# Patient Record
Sex: FEMALE | Race: WHITE | Employment: OTHER | URBAN - METROPOLITAN AREA
[De-identification: names, ages, dates, MRNs, and addresses within clinical notes are randomized per-mention and may not be internally consistent; named-entity substitution may affect disease eponyms.]

---

## 2024-08-13 ENCOUNTER — HOSPITAL ENCOUNTER (INPATIENT)
Age: 69
LOS: 3 days | Discharge: PSYCHIATRIC OR SUBSTANCE USE FACILITY | DRG: 312 | End: 2024-08-19
Attending: EMERGENCY MEDICINE | Admitting: HOSPITALIST
Payer: MEDICARE

## 2024-08-13 ENCOUNTER — APPOINTMENT (OUTPATIENT)
Dept: GENERAL RADIOLOGY | Age: 69
DRG: 312 | End: 2024-08-13
Payer: MEDICARE

## 2024-08-13 DIAGNOSIS — R55 SYNCOPE AND COLLAPSE: Primary | ICD-10-CM

## 2024-08-13 DIAGNOSIS — R55 SYNCOPE, UNSPECIFIED SYNCOPE TYPE: ICD-10-CM

## 2024-08-13 LAB
ALBUMIN SERPL-MCNC: 4.2 G/DL (ref 3.4–5)
ALBUMIN/GLOB SERPL: 1.5 {RATIO} (ref 1.1–2.2)
ALP SERPL-CCNC: 82 U/L (ref 40–129)
ALT SERPL-CCNC: 6 U/L (ref 10–40)
ANION GAP SERPL CALCULATED.3IONS-SCNC: 17 MMOL/L (ref 3–16)
AST SERPL-CCNC: 17 U/L (ref 15–37)
BASOPHILS # BLD: 0.1 K/UL (ref 0–0.2)
BASOPHILS NFR BLD: 0.8 %
BILIRUB SERPL-MCNC: 0.6 MG/DL (ref 0–1)
BUN SERPL-MCNC: 23 MG/DL (ref 7–20)
CALCIUM SERPL-MCNC: 9.8 MG/DL (ref 8.3–10.6)
CHLORIDE SERPL-SCNC: 98 MMOL/L (ref 99–110)
CO2 SERPL-SCNC: 22 MMOL/L (ref 21–32)
CREAT SERPL-MCNC: 1.1 MG/DL (ref 0.6–1.2)
DEPRECATED RDW RBC AUTO: 13.6 % (ref 12.4–15.4)
EOSINOPHIL # BLD: 0 K/UL (ref 0–0.6)
EOSINOPHIL NFR BLD: 0.3 %
GFR SERPLBLD CREATININE-BSD FMLA CKD-EPI: 54 ML/MIN/{1.73_M2}
GLUCOSE SERPL-MCNC: 115 MG/DL (ref 70–99)
HCT VFR BLD AUTO: 46 % (ref 36–48)
HGB BLD-MCNC: 15.5 G/DL (ref 12–16)
LYMPHOCYTES # BLD: 1.1 K/UL (ref 1–5.1)
LYMPHOCYTES NFR BLD: 14.4 %
MCH RBC QN AUTO: 29.2 PG (ref 26–34)
MCHC RBC AUTO-ENTMCNC: 33.8 G/DL (ref 31–36)
MCV RBC AUTO: 86.4 FL (ref 80–100)
MONOCYTES # BLD: 0.4 K/UL (ref 0–1.3)
MONOCYTES NFR BLD: 4.6 %
NEUTROPHILS # BLD: 6.2 K/UL (ref 1.7–7.7)
NEUTROPHILS NFR BLD: 79.9 %
PLATELET # BLD AUTO: 324 K/UL (ref 135–450)
PMV BLD AUTO: 7 FL (ref 5–10.5)
POTASSIUM SERPL-SCNC: 3.7 MMOL/L (ref 3.5–5.1)
PROT SERPL-MCNC: 7 G/DL (ref 6.4–8.2)
RBC # BLD AUTO: 5.32 M/UL (ref 4–5.2)
SODIUM SERPL-SCNC: 137 MMOL/L (ref 136–145)
TROPONIN, HIGH SENSITIVITY: 8 NG/L (ref 0–14)
WBC # BLD AUTO: 7.8 K/UL (ref 4–11)

## 2024-08-13 PROCEDURE — 84484 ASSAY OF TROPONIN QUANT: CPT

## 2024-08-13 PROCEDURE — 71045 X-RAY EXAM CHEST 1 VIEW: CPT

## 2024-08-13 PROCEDURE — 85025 COMPLETE CBC W/AUTO DIFF WBC: CPT

## 2024-08-13 PROCEDURE — 93005 ELECTROCARDIOGRAM TRACING: CPT | Performed by: EMERGENCY MEDICINE

## 2024-08-13 PROCEDURE — 36415 COLL VENOUS BLD VENIPUNCTURE: CPT

## 2024-08-13 PROCEDURE — G0378 HOSPITAL OBSERVATION PER HR: HCPCS

## 2024-08-13 PROCEDURE — 2580000003 HC RX 258: Performed by: HOSPITALIST

## 2024-08-13 PROCEDURE — 99285 EMERGENCY DEPT VISIT HI MDM: CPT

## 2024-08-13 PROCEDURE — 2580000003 HC RX 258: Performed by: EMERGENCY MEDICINE

## 2024-08-13 PROCEDURE — 80053 COMPREHEN METABOLIC PANEL: CPT

## 2024-08-13 RX ORDER — SODIUM CHLORIDE 0.9 % (FLUSH) 0.9 %
5-40 SYRINGE (ML) INJECTION EVERY 12 HOURS SCHEDULED
Status: DISCONTINUED | OUTPATIENT
Start: 2024-08-13 | End: 2024-08-19 | Stop reason: HOSPADM

## 2024-08-13 RX ORDER — 0.9 % SODIUM CHLORIDE 0.9 %
1000 INTRAVENOUS SOLUTION INTRAVENOUS ONCE
Status: COMPLETED | OUTPATIENT
Start: 2024-08-13 | End: 2024-08-13

## 2024-08-13 RX ORDER — ACETAMINOPHEN 650 MG/1
650 SUPPOSITORY RECTAL EVERY 6 HOURS PRN
Status: DISCONTINUED | OUTPATIENT
Start: 2024-08-13 | End: 2024-08-19 | Stop reason: HOSPADM

## 2024-08-13 RX ORDER — POTASSIUM CHLORIDE 7.45 MG/ML
10 INJECTION INTRAVENOUS PRN
Status: DISCONTINUED | OUTPATIENT
Start: 2024-08-13 | End: 2024-08-19 | Stop reason: HOSPADM

## 2024-08-13 RX ORDER — POTASSIUM CHLORIDE 20 MEQ/1
40 TABLET, EXTENDED RELEASE ORAL PRN
Status: DISCONTINUED | OUTPATIENT
Start: 2024-08-13 | End: 2024-08-19 | Stop reason: HOSPADM

## 2024-08-13 RX ORDER — SODIUM CHLORIDE 9 MG/ML
INJECTION, SOLUTION INTRAVENOUS CONTINUOUS
Status: DISCONTINUED | OUTPATIENT
Start: 2024-08-13 | End: 2024-08-15

## 2024-08-13 RX ORDER — ONDANSETRON 2 MG/ML
4 INJECTION INTRAMUSCULAR; INTRAVENOUS EVERY 6 HOURS PRN
Status: DISCONTINUED | OUTPATIENT
Start: 2024-08-13 | End: 2024-08-19 | Stop reason: HOSPADM

## 2024-08-13 RX ORDER — ENOXAPARIN SODIUM 100 MG/ML
40 INJECTION SUBCUTANEOUS DAILY
Status: DISCONTINUED | OUTPATIENT
Start: 2024-08-14 | End: 2024-08-19 | Stop reason: HOSPADM

## 2024-08-13 RX ORDER — SODIUM CHLORIDE 0.9 % (FLUSH) 0.9 %
5-40 SYRINGE (ML) INJECTION PRN
Status: DISCONTINUED | OUTPATIENT
Start: 2024-08-13 | End: 2024-08-19 | Stop reason: HOSPADM

## 2024-08-13 RX ORDER — SODIUM CHLORIDE 9 MG/ML
INJECTION, SOLUTION INTRAVENOUS PRN
Status: DISCONTINUED | OUTPATIENT
Start: 2024-08-13 | End: 2024-08-19 | Stop reason: HOSPADM

## 2024-08-13 RX ORDER — POLYETHYLENE GLYCOL 3350 17 G/17G
17 POWDER, FOR SOLUTION ORAL DAILY PRN
Status: DISCONTINUED | OUTPATIENT
Start: 2024-08-13 | End: 2024-08-19 | Stop reason: HOSPADM

## 2024-08-13 RX ORDER — MAGNESIUM SULFATE IN WATER 40 MG/ML
2000 INJECTION, SOLUTION INTRAVENOUS PRN
Status: DISCONTINUED | OUTPATIENT
Start: 2024-08-13 | End: 2024-08-19 | Stop reason: HOSPADM

## 2024-08-13 RX ORDER — ONDANSETRON 4 MG/1
4 TABLET, ORALLY DISINTEGRATING ORAL EVERY 8 HOURS PRN
Status: DISCONTINUED | OUTPATIENT
Start: 2024-08-13 | End: 2024-08-19 | Stop reason: HOSPADM

## 2024-08-13 RX ORDER — ACETAMINOPHEN 325 MG/1
650 TABLET ORAL EVERY 6 HOURS PRN
Status: DISCONTINUED | OUTPATIENT
Start: 2024-08-13 | End: 2024-08-19 | Stop reason: HOSPADM

## 2024-08-13 RX ADMIN — SODIUM CHLORIDE 1000 ML: 9 INJECTION, SOLUTION INTRAVENOUS at 17:53

## 2024-08-13 RX ADMIN — SODIUM CHLORIDE: 9 INJECTION, SOLUTION INTRAVENOUS at 23:47

## 2024-08-13 ASSESSMENT — LIFESTYLE VARIABLES
HOW MANY STANDARD DRINKS CONTAINING ALCOHOL DO YOU HAVE ON A TYPICAL DAY: PATIENT DOES NOT DRINK
HOW OFTEN DO YOU HAVE A DRINK CONTAINING ALCOHOL: NEVER

## 2024-08-13 ASSESSMENT — PAIN SCALES - GENERAL: PAINLEVEL_OUTOF10: 0

## 2024-08-13 NOTE — ED PROVIDER NOTES
complexes otherwise normal    ED COURSE/MDM    68 y.o. female presents following a witnessed syncopal episode.  No prodromal symptoms.  Patient is hypertensive with otherwise reassuring vital signs.  She is asymptomatic with respect to the hypertension.  EKG shows no acute ischemic abnormality to my depend interpretation.  Chest x-ray no focal infiltrate to my independent interpretation.  CBC no anemia.  Renal panel without significant electrolyte abnormality.  Creatinine within normal limits.  No recent echo on file.  Case discussed with Dr. Gerber/[facility] medicine via [employer].  Admit to  for further evaluation and treatment.    - History obtained from: pt, EMS     - Consults:   IP CONSULT TO HOSPITALIST     Chronic Conditions: HTN      Is this patient to be included in the SEP-1 Core Measure?  No     Exclusion criteria - the patient is NOT to be included for SEP-1 Core Measure due to:  Infection is not suspected    IBryna MD, am the primary clinician of record.     During the patient's ED course, the patient was given:  Medications   sodium chloride 0.9 % bolus 1,000 mL (0 mLs IntraVENous Stopped 8/13/24 1900)      All questions were answered and the patient/family expressed understanding and agreement with the plan.     PROCEDURES  None    CRITICAL CARE  N/A    CLINICAL IMPRESSION  1. Syncope and collapse        DISPOSITION  Admitted 08/13/2024 08:49:57 PM     Bryan Mcdaniel MD    Note: This chart was created using voice recognition dictation software. Efforts were made by me to ensure accuracy, however some errors may be present due to limitations of this technology and occasionally words are not transcribed correctly.      Bryan Mcdaniel MD  08/13/24 0025

## 2024-08-13 NOTE — ED TRIAGE NOTES
PT to room by [facility]. Per EMS, pt had syncopal episode at home in the presence of her son. Pt son caught pt and lowered her to floor, and called EMS, EMS denies pt fall or injury, and states the son said she was only unconscious for \"a few seconds\". EMS reports hx of dementia and states they were unable to determine if pt mental status deviated from baseline. EMS states pt son is in route. Pt denies pain currently.

## 2024-08-14 ENCOUNTER — APPOINTMENT (OUTPATIENT)
Dept: CT IMAGING | Age: 69
DRG: 312 | End: 2024-08-14
Payer: MEDICARE

## 2024-08-14 ENCOUNTER — APPOINTMENT (OUTPATIENT)
Age: 69
DRG: 312 | End: 2024-08-14
Payer: MEDICARE

## 2024-08-14 LAB
ALBUMIN SERPL-MCNC: 3.7 G/DL (ref 3.4–5)
ALP SERPL-CCNC: 66 U/L (ref 40–129)
ALT SERPL-CCNC: <5 U/L (ref 10–40)
ANION GAP SERPL CALCULATED.3IONS-SCNC: 10 MMOL/L (ref 3–16)
AST SERPL-CCNC: 15 U/L (ref 15–37)
BASOPHILS # BLD: 0.1 K/UL (ref 0–0.2)
BASOPHILS NFR BLD: 0.9 %
BILIRUB DIRECT SERPL-MCNC: <0.2 MG/DL (ref 0–0.3)
BILIRUB INDIRECT SERPL-MCNC: ABNORMAL MG/DL (ref 0–1)
BILIRUB SERPL-MCNC: 0.6 MG/DL (ref 0–1)
BILIRUB UR QL STRIP.AUTO: NEGATIVE
BUN SERPL-MCNC: 29 MG/DL (ref 7–20)
CALCIUM SERPL-MCNC: 9 MG/DL (ref 8.3–10.6)
CHLORIDE SERPL-SCNC: 101 MMOL/L (ref 99–110)
CLARITY UR: CLEAR
CO2 SERPL-SCNC: 22 MMOL/L (ref 21–32)
COLOR UR: YELLOW
CREAT SERPL-MCNC: 0.9 MG/DL (ref 0.6–1.2)
DEPRECATED RDW RBC AUTO: 13.5 % (ref 12.4–15.4)
ECHO AO ROOT DIAM: 3.3 CM
ECHO AO ROOT INDEX: 1.91 CM/M2
ECHO AV PEAK GRADIENT: 8 MMHG
ECHO AV PEAK VELOCITY: 1.4 M/S
ECHO AV VELOCITY RATIO: 0.57
ECHO BSA: 1.68 M2
ECHO LA AREA 2C: 14.6 CM2
ECHO LA AREA 4C: 11.8 CM2
ECHO LA DIAMETER INDEX: 1.62 CM/M2
ECHO LA DIAMETER: 2.8 CM
ECHO LA MAJOR AXIS: 4 CM
ECHO LA MINOR AXIS: 4.2 CM
ECHO LA TO AORTIC ROOT RATIO: 0.85
ECHO LA VOL BP: 34 ML (ref 22–52)
ECHO LA VOL MOD A2C: 40 ML (ref 22–52)
ECHO LA VOL MOD A4C: 27 ML (ref 22–52)
ECHO LA VOL/BSA BIPLANE: 20 ML/M2 (ref 16–34)
ECHO LA VOLUME INDEX MOD A2C: 23 ML/M2 (ref 16–34)
ECHO LA VOLUME INDEX MOD A4C: 16 ML/M2 (ref 16–34)
ECHO LV E' LATERAL VELOCITY: 5 CM/S
ECHO LV E' SEPTAL VELOCITY: 6 CM/S
ECHO LV FRACTIONAL SHORTENING: 20 % (ref 28–44)
ECHO LV INTERNAL DIMENSION DIASTOLE INDEX: 2.31 CM/M2
ECHO LV INTERNAL DIMENSION DIASTOLIC: 4 CM (ref 3.9–5.3)
ECHO LV INTERNAL DIMENSION SYSTOLIC INDEX: 1.85 CM/M2
ECHO LV INTERNAL DIMENSION SYSTOLIC: 3.2 CM
ECHO LV IVSD: 1.4 CM (ref 0.6–0.9)
ECHO LV MASS 2D: 175.8 G (ref 67–162)
ECHO LV MASS INDEX 2D: 101.6 G/M2 (ref 43–95)
ECHO LV POSTERIOR WALL DIASTOLIC: 1.1 CM (ref 0.6–0.9)
ECHO LV RELATIVE WALL THICKNESS RATIO: 0.55
ECHO LVOT PEAK GRADIENT: 3 MMHG
ECHO LVOT PEAK VELOCITY: 0.8 M/S
ECHO MV A VELOCITY: 1.19 M/S
ECHO MV E DECELERATION TIME (DT): 142 MS
ECHO MV E VELOCITY: 0.69 M/S
ECHO MV E/A RATIO: 0.58
ECHO MV E/E' LATERAL: 13.8
ECHO MV E/E' RATIO (AVERAGED): 12.65
ECHO MV E/E' SEPTAL: 11.5
ECHO PV MAX VELOCITY: 1 M/S
ECHO PV PEAK GRADIENT: 4 MMHG
ECHO RA AREA 4C: 9.5 CM2
ECHO RA END SYSTOLIC VOLUME APICAL 4 CHAMBER INDEX BSA: 10 ML/M2
ECHO RA VOLUME: 18 ML
ECHO RV FREE WALL PEAK S': 11 CM/S
ECHO RV INTERNAL DIMENSION: 1.2 CM
ECHO RV TAPSE: 2 CM (ref 1.7–?)
ECHO TV REGURGITANT MAX VELOCITY: 2.44 M/S
ECHO TV REGURGITANT PEAK GRADIENT: 24 MMHG
EKG ATRIAL RATE: 106 BPM
EKG DIAGNOSIS: NORMAL
EKG P AXIS: 76 DEGREES
EKG P-R INTERVAL: 146 MS
EKG Q-T INTERVAL: 350 MS
EKG QRS DURATION: 82 MS
EKG QTC CALCULATION (BAZETT): 464 MS
EKG R AXIS: 79 DEGREES
EKG T AXIS: 77 DEGREES
EKG VENTRICULAR RATE: 106 BPM
EOSINOPHIL # BLD: 0.1 K/UL (ref 0–0.6)
EOSINOPHIL NFR BLD: 1.1 %
FOLATE SERPL-MCNC: 12.8 NG/ML (ref 4.78–24.2)
GFR SERPLBLD CREATININE-BSD FMLA CKD-EPI: 69 ML/MIN/{1.73_M2}
GLUCOSE SERPL-MCNC: 73 MG/DL (ref 70–99)
GLUCOSE UR STRIP.AUTO-MCNC: NEGATIVE MG/DL
HCT VFR BLD AUTO: 39.9 % (ref 36–48)
HGB BLD-MCNC: 13.7 G/DL (ref 12–16)
HGB UR QL STRIP.AUTO: NEGATIVE
KETONES UR STRIP.AUTO-MCNC: NEGATIVE MG/DL
LACTATE BLDV-SCNC: 0.8 MMOL/L (ref 0.4–2)
LEUKOCYTE ESTERASE UR QL STRIP.AUTO: NEGATIVE
LYMPHOCYTES # BLD: 1.6 K/UL (ref 1–5.1)
LYMPHOCYTES NFR BLD: 26 %
MCH RBC QN AUTO: 29.4 PG (ref 26–34)
MCHC RBC AUTO-ENTMCNC: 34.3 G/DL (ref 31–36)
MCV RBC AUTO: 85.9 FL (ref 80–100)
MONOCYTES # BLD: 0.5 K/UL (ref 0–1.3)
MONOCYTES NFR BLD: 7.9 %
NEUTROPHILS # BLD: 4 K/UL (ref 1.7–7.7)
NEUTROPHILS NFR BLD: 64.1 %
NITRITE UR QL STRIP.AUTO: NEGATIVE
PH UR STRIP.AUTO: 5.5 [PH] (ref 5–8)
PLATELET # BLD AUTO: 271 K/UL (ref 135–450)
PMV BLD AUTO: 7.4 FL (ref 5–10.5)
POTASSIUM SERPL-SCNC: 3.5 MMOL/L (ref 3.5–5.1)
PROT SERPL-MCNC: 6 G/DL (ref 6.4–8.2)
PROT UR STRIP.AUTO-MCNC: NEGATIVE MG/DL
RBC # BLD AUTO: 4.64 M/UL (ref 4–5.2)
SODIUM SERPL-SCNC: 133 MMOL/L (ref 136–145)
SP GR UR STRIP.AUTO: 1.02 (ref 1–1.03)
TROPONIN, HIGH SENSITIVITY: 10 NG/L (ref 0–14)
TROPONIN, HIGH SENSITIVITY: 9 NG/L (ref 0–14)
TSH SERPL DL<=0.005 MIU/L-ACNC: 2.29 UIU/ML (ref 0.27–4.2)
UA COMPLETE W REFLEX CULTURE PNL UR: NORMAL
UA DIPSTICK W REFLEX MICRO PNL UR: NORMAL
URN SPEC COLLECT METH UR: NORMAL
UROBILINOGEN UR STRIP-ACNC: 1 E.U./DL
VIT B12 SERPL-MCNC: 240 PG/ML (ref 211–911)
WBC # BLD AUTO: 6.2 K/UL (ref 4–11)

## 2024-08-14 PROCEDURE — 80048 BASIC METABOLIC PNL TOTAL CA: CPT

## 2024-08-14 PROCEDURE — G0378 HOSPITAL OBSERVATION PER HR: HCPCS

## 2024-08-14 PROCEDURE — 83605 ASSAY OF LACTIC ACID: CPT

## 2024-08-14 PROCEDURE — 96372 THER/PROPH/DIAG INJ SC/IM: CPT

## 2024-08-14 PROCEDURE — 81003 URINALYSIS AUTO W/O SCOPE: CPT

## 2024-08-14 PROCEDURE — 70450 CT HEAD/BRAIN W/O DYE: CPT

## 2024-08-14 PROCEDURE — 93306 TTE W/DOPPLER COMPLETE: CPT | Performed by: INTERNAL MEDICINE

## 2024-08-14 PROCEDURE — 84443 ASSAY THYROID STIM HORMONE: CPT

## 2024-08-14 PROCEDURE — 82746 ASSAY OF FOLIC ACID SERUM: CPT

## 2024-08-14 PROCEDURE — 6360000002 HC RX W HCPCS: Performed by: HOSPITALIST

## 2024-08-14 PROCEDURE — 82607 VITAMIN B-12: CPT

## 2024-08-14 PROCEDURE — 80076 HEPATIC FUNCTION PANEL: CPT

## 2024-08-14 PROCEDURE — 93306 TTE W/DOPPLER COMPLETE: CPT

## 2024-08-14 PROCEDURE — 97165 OT EVAL LOW COMPLEX 30 MIN: CPT

## 2024-08-14 PROCEDURE — 83735 ASSAY OF MAGNESIUM: CPT

## 2024-08-14 PROCEDURE — 36415 COLL VENOUS BLD VENIPUNCTURE: CPT

## 2024-08-14 PROCEDURE — 6370000000 HC RX 637 (ALT 250 FOR IP): Performed by: HOSPITALIST

## 2024-08-14 PROCEDURE — 94760 N-INVAS EAR/PLS OXIMETRY 1: CPT

## 2024-08-14 PROCEDURE — 84484 ASSAY OF TROPONIN QUANT: CPT

## 2024-08-14 PROCEDURE — 97530 THERAPEUTIC ACTIVITIES: CPT

## 2024-08-14 PROCEDURE — 93010 ELECTROCARDIOGRAM REPORT: CPT | Performed by: INTERNAL MEDICINE

## 2024-08-14 PROCEDURE — 6370000000 HC RX 637 (ALT 250 FOR IP)

## 2024-08-14 PROCEDURE — 85025 COMPLETE CBC W/AUTO DIFF WBC: CPT

## 2024-08-14 PROCEDURE — 2580000003 HC RX 258: Performed by: HOSPITALIST

## 2024-08-14 RX ORDER — LISINOPRIL 5 MG/1
5 TABLET ORAL DAILY
Status: DISCONTINUED | OUTPATIENT
Start: 2024-08-14 | End: 2024-08-19 | Stop reason: HOSPADM

## 2024-08-14 RX ORDER — NICOTINE 21 MG/24HR
1 PATCH, TRANSDERMAL 24 HOURS TRANSDERMAL DAILY
Status: DISCONTINUED | OUTPATIENT
Start: 2024-08-14 | End: 2024-08-19 | Stop reason: HOSPADM

## 2024-08-14 RX ADMIN — SODIUM CHLORIDE: 9 INJECTION, SOLUTION INTRAVENOUS at 15:07

## 2024-08-14 RX ADMIN — LISINOPRIL 5 MG: 5 TABLET ORAL at 15:05

## 2024-08-14 RX ADMIN — ENOXAPARIN SODIUM 40 MG: 100 INJECTION SUBCUTANEOUS at 08:36

## 2024-08-14 NOTE — CARE COORDINATION
Spoke w/ son Fredi/Raphael- he would prefer placement to LTC facility from hospital if possible   would like referral made to [facility]  Referral made per EPIC  Will follow     Electronically signed by Bernarda Michelle on 8/14/2024 at 5:21 PM  #[phone]

## 2024-08-14 NOTE — H&P
echocardiogram      Consultations Ordered:  IP CONSULT TO HOSPITALIST    Electronically signed by Samantha Salas Jr, MD on 8/13/24 at 8:50 PM EDT

## 2024-08-14 NOTE — CARE COORDINATION
08/14/24 1529   Service Assessment   Patient Orientation Alert and Oriented   Cognition Dementia / Early Alzheimer's   History Provided By Child/Family   Primary Caregiver Family   Accompanied By/Relationship no one   Support Systems Children   Patient's Healthcare Decision Maker is: Legal Next of Kin   PCP Verified by CM Yes   Last Visit to PCP Within last two years  (unknown)   Prior Functional Level Independent in ADLs/IADLs   Current Functional Level Independent in ADLs/IADLs   Can patient return to prior living arrangement Unknown at present   Ability to make needs known: Fair   Family able to assist with home care needs: No  (son working on LTc at [facility] [employer] [facility] [employer])   Would you like for me to discuss the discharge plan with any other family members/significant others, and if so, who? Yes  (son Fredi)   Financial Resources Medicare;Medicaid   Community Resources None   CM/SW Referral Other (see comment)  (dc needs)   Discharge Planning   Type of Residence House   Living Arrangements Children   Current Services Prior To Admission Durable Medical Equipment   Current DME Prior to Arrival Other (Comment)  (grab bars in shower)   Potential Assistance Needed Extended Care Facility   DME Ordered? No   Potential Assistance Purchasing Medications No   Type of Home Care Services None   Patient expects to be discharged to: Unknown   Services At/After Discharge    Resource Information Provided? No   Mode of Transport at Discharge Other (see comment)  (son)     Case Management Assessment  Initial Evaluation    Date/Time of Evaluation: 8/14/2024 3:34 PM  Assessment Completed by: Bernarda Michelle    If patient is discharged prior to next notation, then this note serves as note for discharge by case management.    Patient Name: Carleen Colvin                   YOB: 1955  Diagnosis: Syncope and collapse [R55]                   Date / Time: 8/13/2024  5:13 PM    Patient Admission Status: Observation

## 2024-08-14 NOTE — PLAN OF CARE
Problem: Discharge Planning  Goal: Discharge to home or other facility with appropriate resources  Outcome: Progressing   Observation overnight for syncope and collapse.      Problem: Safety - Adult  Goal: Free from fall injury  Outcome: Progressing   Patient has remained free of falls. 2/4 bed rails up, bed locked and in lowest position, call light within reach. Patient instructed on use of call light and uses appropriately. Bed alarm on. Non-skid footwear and fall band on.

## 2024-08-14 NOTE — PLAN OF CARE
Problem: Discharge Planning  Goal: Discharge to home or other facility with appropriate resources  8/14/2024 1133 by June Kwok, RN  Outcome: Progressing  Flowsheets (Taken 8/14/2024 1133)  Discharge to home or other facility with appropriate resources: Identify barriers to discharge with patient and caregiver  8/14/2024 0033 by Annie Webster, RN  Outcome: Progressing     Problem: Safety - Adult  Goal: Free from fall injury  8/14/2024 1133 by June Kwok, RN  Outcome: Progressing  Flowsheets (Taken 8/14/2024 1133)  Free From Fall Injury: Instruct family/caregiver on patient safety  8/14/2024 0033 by Annie Webster, RN  Outcome: Progressing

## 2024-08-15 ENCOUNTER — APPOINTMENT (OUTPATIENT)
Dept: MRI IMAGING | Age: 69
DRG: 312 | End: 2024-08-15
Payer: MEDICARE

## 2024-08-15 LAB
ANION GAP SERPL CALCULATED.3IONS-SCNC: 10 MMOL/L (ref 3–16)
BUN SERPL-MCNC: 18 MG/DL (ref 7–20)
CALCIUM SERPL-MCNC: 9.5 MG/DL (ref 8.3–10.6)
CHLORIDE SERPL-SCNC: 103 MMOL/L (ref 99–110)
CO2 SERPL-SCNC: 26 MMOL/L (ref 21–32)
CREAT SERPL-MCNC: 0.8 MG/DL (ref 0.6–1.2)
GFR SERPLBLD CREATININE-BSD FMLA CKD-EPI: 79 ML/MIN/{1.73_M2}
GLUCOSE SERPL-MCNC: 100 MG/DL (ref 70–99)
MAGNESIUM SERPL-MCNC: 1.97 MG/DL (ref 1.8–2.4)
MAGNESIUM SERPL-MCNC: 2.07 MG/DL (ref 1.8–2.4)
POTASSIUM SERPL-SCNC: 3.5 MMOL/L (ref 3.5–5.1)
SODIUM SERPL-SCNC: 139 MMOL/L (ref 136–145)

## 2024-08-15 PROCEDURE — G0378 HOSPITAL OBSERVATION PER HR: HCPCS

## 2024-08-15 PROCEDURE — 6370000000 HC RX 637 (ALT 250 FOR IP): Performed by: HOSPITALIST

## 2024-08-15 PROCEDURE — 6360000002 HC RX W HCPCS: Performed by: HOSPITALIST

## 2024-08-15 PROCEDURE — 97161 PT EVAL LOW COMPLEX 20 MIN: CPT

## 2024-08-15 PROCEDURE — 36415 COLL VENOUS BLD VENIPUNCTURE: CPT

## 2024-08-15 PROCEDURE — 94760 N-INVAS EAR/PLS OXIMETRY 1: CPT

## 2024-08-15 PROCEDURE — 96372 THER/PROPH/DIAG INJ SC/IM: CPT

## 2024-08-15 PROCEDURE — 70551 MRI BRAIN STEM W/O DYE: CPT

## 2024-08-15 PROCEDURE — 6370000000 HC RX 637 (ALT 250 FOR IP)

## 2024-08-15 PROCEDURE — 80048 BASIC METABOLIC PNL TOTAL CA: CPT

## 2024-08-15 PROCEDURE — 83735 ASSAY OF MAGNESIUM: CPT

## 2024-08-15 RX ORDER — UBIDECARENONE 75 MG
50 CAPSULE ORAL DAILY
Status: DISCONTINUED | OUTPATIENT
Start: 2024-08-15 | End: 2024-08-16

## 2024-08-15 RX ADMIN — Medication 50 MCG: at 15:00

## 2024-08-15 RX ADMIN — ENOXAPARIN SODIUM 40 MG: 100 INJECTION SUBCUTANEOUS at 08:42

## 2024-08-15 RX ADMIN — LISINOPRIL 5 MG: 5 TABLET ORAL at 08:42

## 2024-08-15 ASSESSMENT — PAIN SCALES - GENERAL: PAINLEVEL_OUTOF10: 0

## 2024-08-15 NOTE — PLAN OF CARE
Problem: Discharge Planning  Goal: Discharge to home or other facility with appropriate resources  8/15/2024 0029 by Kathleen Basilio, RN  Outcome: Progressing  Flowsheets (Taken 8/14/2024 1133 by June Kwok, RN)  Discharge to home or other facility with appropriate resources: Identify barriers to discharge with patient and caregiver  8/14/2024 1133 by June Kwok, RN  Outcome: Progressing  Flowsheets (Taken 8/14/2024 1133)  Discharge to home or other facility with appropriate resources: Identify barriers to discharge with patient and caregiver     Problem: Safety - Adult  Goal: Free from fall injury  8/15/2024 0029 by Kathleen Basilio, RN  Outcome: Progressing  Flowsheets (Taken 8/14/2024 1133 by June Kwok, RN)  Free From Fall Injury: Instruct family/caregiver on patient safety  8/14/2024 1133 by June Kwok, RN  Outcome: Progressing  Flowsheets (Taken 8/14/2024 1133)  Free From Fall Injury: Instruct family/caregiver on patient safety

## 2024-08-15 NOTE — PLAN OF CARE
Problem: Discharge Planning  Goal: Discharge to home or other facility with appropriate resources  8/15/2024 0924 by Romina River, RN  Outcome: Progressing  Flowsheets (Taken 8/14/2024 1133 by June Kwok, RN)  Discharge to home or other facility with appropriate resources: Identify barriers to discharge with patient and caregiver  8/15/2024 0029 by Kathleen Basilio, RN  Outcome: Progressing  Flowsheets (Taken 8/14/2024 1133 by June Kwok, RN)  Discharge to home or other facility with appropriate resources: Identify barriers to discharge with patient and caregiver     Problem: Safety - Adult  Goal: Free from fall injury  8/15/2024 0924 by Romina River, RN  Outcome: Progressing  Flowsheets (Taken 8/14/2024 1133 by June Kwok, RN)  Free From Fall Injury: Instruct family/caregiver on patient safety  8/15/2024 0029 by Kathleen Basilio, RN  Outcome: Progressing  Flowsheets (Taken 8/14/2024 1133 by June Kwok, RN)  Free From Fall Injury: Instruct family/caregiver on patient safety

## 2024-08-15 NOTE — CARE COORDINATION
[facility] can now admit until bed at [facility] and son (Raphael @ [phone]) is in agreement with this plan. [facility] will initiate [place] precert process. Patient is going under her [place] medicaid not skilled Medicare.   Facility may be able to transport or family (daughter or son's girlfriend)  Noted neurology consult / recommendations.  Electronically signed by MORGAN Roman on 8/15/2024 at 4:06 PM

## 2024-08-16 PROCEDURE — 97110 THERAPEUTIC EXERCISES: CPT

## 2024-08-16 PROCEDURE — 6370000000 HC RX 637 (ALT 250 FOR IP): Performed by: HOSPITALIST

## 2024-08-16 PROCEDURE — 6370000000 HC RX 637 (ALT 250 FOR IP): Performed by: INTERNAL MEDICINE

## 2024-08-16 PROCEDURE — 96372 THER/PROPH/DIAG INJ SC/IM: CPT

## 2024-08-16 PROCEDURE — G0378 HOSPITAL OBSERVATION PER HR: HCPCS

## 2024-08-16 PROCEDURE — 97530 THERAPEUTIC ACTIVITIES: CPT

## 2024-08-16 PROCEDURE — 6360000002 HC RX W HCPCS: Performed by: HOSPITALIST

## 2024-08-16 PROCEDURE — 6370000000 HC RX 637 (ALT 250 FOR IP)

## 2024-08-16 PROCEDURE — 94760 N-INVAS EAR/PLS OXIMETRY 1: CPT

## 2024-08-16 PROCEDURE — 1200000000 HC SEMI PRIVATE

## 2024-08-16 RX ORDER — UREA 10 %
500 LOTION (ML) TOPICAL DAILY
Qty: 30 TABLET | Refills: 0
Start: 2024-08-16 | End: 2024-09-15

## 2024-08-16 RX ORDER — LANOLIN ALCOHOL/MO/W.PET/CERES
1000 CREAM (GRAM) TOPICAL DAILY
Status: DISCONTINUED | OUTPATIENT
Start: 2024-08-16 | End: 2024-08-19 | Stop reason: HOSPADM

## 2024-08-16 RX ORDER — LISINOPRIL 5 MG/1
5 TABLET ORAL DAILY
Qty: 30 TABLET | Refills: 0
Start: 2024-08-17

## 2024-08-16 RX ADMIN — Medication 50 MCG: at 08:41

## 2024-08-16 RX ADMIN — CYANOCOBALAMIN TAB 1000 MCG 1000 MCG: 1000 TAB at 11:40

## 2024-08-16 RX ADMIN — LISINOPRIL 5 MG: 5 TABLET ORAL at 08:41

## 2024-08-16 RX ADMIN — ENOXAPARIN SODIUM 40 MG: 100 INJECTION SUBCUTANEOUS at 08:41

## 2024-08-16 NOTE — CARE COORDINATION
Case Management Discharge Note          Date / Time of Note: 8/16/2024 2:57 PM                  Patient Name: Carleen Colvin   YOB: 1955  Diagnosis: Syncope and collapse [R55]   Date / Time: 8/13/2024  5:13 PM    Financial:  Payor: MEDICARE / Plan: MEDICARE PART A AND B / Product Type: *No Product type* /      Pharmacy:    [id number] - [place] - [address] - P [phone] - F [phone]  [address] [place] [address]  Phone: [phone] Fax: [phone]    [facility] [place] [place], [place] - [address] [phone] - F [phone]  [address] [place] [address]  Phone: [phone] Fax: [phone]      Assistance purchasing medications?: Potential Assistance Purchasing Medications: No  Assistance provided by Case Management: None at this time    DISCHARGE Disposition: Skilled Nursing Facility (SNF)    Nursing Facility:   Name: [facility]  Address: [address] [place] [address]   Nursing Report Phone: [phone]  Fax: [phone]    LOC at discharge: Skilled Nursing  GLORY Completed: Yes              Notification completed in HENS/PAS?: [facility] does their own.     Transportation:  Transportation PLAN for discharge: EMS transportation   Mode of Transport: Ambulance stretcher - BLS  Reason for medical transport: Other: remains a fall risk, history of COPD, recent fall.  Name of Transport Company: [employer]  Phone: [phone]  Time of Transport: 0945 on Saturday 8/17/2024    Transport form completed: Yes    IMM Completed:   Yes, Case management has presented and reviewed IMM letter #2.   IMM Letter given to Patient/Family/Significant other/Guardian/POA/by:: reviewed with patient at bedside and left a copy. SLR   IMM Letter date given:: 08/16/24  IMM Letter time given:: 1250.   Patient and/or family/POA verbalized understanding of their medicare rights and appeal

## 2024-08-16 NOTE — PLAN OF CARE
Problem: Discharge Planning  Goal: Discharge to home or other facility with appropriate resources  8/15/2024 2313 by Kathleen Basilio RN  Outcome: Progressing  Flowsheets (Taken 8/14/2024 1133 by June Kwok, RN)  Discharge to home or other facility with appropriate resources: Identify barriers to discharge with patient and caregiver  8/15/2024 0924 by Romina River RN  Outcome: Progressing  Flowsheets (Taken 8/14/2024 1133 by June Kwok, RN)  Discharge to home or other facility with appropriate resources: Identify barriers to discharge with patient and caregiver     Problem: Safety - Adult  Goal: Free from fall injury  8/15/2024 2313 by Kathleen Basilio RN  Outcome: Progressing  Flowsheets (Taken 8/15/2024 1705 by Romina River, RN)  Free From Fall Injury: Instruct family/caregiver on patient safety  8/15/2024 0924 by Romina River RN  Outcome: Progressing  Flowsheets (Taken 8/14/2024 1133 by June Kwok, RN)  Free From Fall Injury: Instruct family/caregiver on patient safety     Problem: ABCDS Injury Assessment  Goal: Absence of physical injury  Outcome: Progressing  Flowsheets (Taken 8/15/2024 2313)  Absence of Physical Injury: Implement safety measures based on patient assessment

## 2024-08-16 NOTE — CARE COORDINATION
08/16/24 1308   IMM Letter   IMM Letter given to Patient/Family/Significant other/Guardian/POA/by: reviewed with patient at bedside and left a copy. SLR   IMM Letter date given: 08/16/24   IMM Letter time given: 1250       Respectfully submitted,    Yodit HAIR, ANSON  [facility]   [phone]    Electronically signed by LAXMI Parker, GWENDOLYNW on 8/16/2024 at 1:09 PM

## 2024-08-16 NOTE — CARE COORDINATION
08/16/24 1457   Avoidable Days   Community/External       SNF  (SNF doesn't have enough staffing to bring patient in tonight safely.)       SW spoke to Robert in admissions, advised of discharge readiness and this is what we were told. SW spoke with bedside nurse and we will set up a ride for 0945 on Sat.     Respectfully submitted,    Yodit HAIR, BLANCHE-S  [facility]   [phone]    Electronically signed by LAXMI Parker, LSW on 8/16/2024 at 2:58 PM

## 2024-08-16 NOTE — CARE COORDINATION
COLTEN reached back out to Robert per her request at [facility]. COLTEN advised that she was down for MRI and might discharge afterwards, however, she won't be ready by 1pm. COLTEN will call again when we have another update.     Respectfully submitted,    Yodit HAIR, ANSON  [facility]   [phone]    Electronically signed by LAXMI Parker, GWENDOLYNW on 8/16/2024 at 12:15 PM

## 2024-08-16 NOTE — CARE COORDINATION
COLTEN reached out to [facility] at [facility] today at [phone].   She stated that they are accepting under her long term care and completed the PASSR. She stated that their transport service would have to be in route for transport no later than 1pm due to their schedule today.     COLTEN did advise that we are headed to rounds and will speak with MD regarding discharge plan.    Respectfully submitted,    Yodit HAIR, BLANCHE-S  [facility]   [phone]    Electronically signed by LAXMI Parker, MORGAN on 8/16/2024 at 9:36 AM

## 2024-08-16 NOTE — DISCHARGE SUMMARY
Hospital Medicine Discharge Summary    Patient ID: Carleen Colvin      Patient's PCP: Dwight Guzman MD    Admit Date: 8/13/2024     Discharge Date:   08/16/24    Admitting Physician: Remigio Newsome MD     Discharging Provider: Annie Hernandez PA-C       Hospital Course: 68-year-old female with past medical history of cognitive decline, essential hypertension, COPD, CKD, bipolar 1 who presented to the ED after witnessed syncope episode lasting a few seconds at her home.  Patient is a poor historian, only alert to self.  Per epic chart review, son was at home and caught her when she started to pass out.  No prodromal symptoms were reported.  While in ED, CBC was unremarkable, BMP only noted for elevated BUN and anion gap.  Troponin was negative x 2.  CXR showed no acute cardiopulmonary process.  Head CT was negative.  She was admitted for further workup/treatment. My colleague spoke to patient's son, Fredi, who was with her during event. He reported she does have dizzy spells from time to time and did report dizziness prior to syncope, which lasted 30-40 seconds. Patient's son recently moved back to [place] 1 year ago, and reports she has not been taking any of her medications or seen a doctor in a few years. Workup unremarkable to explain syncopal episode.  Neurology consulted for recommendations on 8/15. Patient had brain MRI which was non-acute. EEG was recommended and will be completed as outpatient. Neurology to order EEG and follow up outpatient regarding results. She was also started on B12 supplementation given cognitive decline. She will need repeat B12 level checked in 1 month to determine need for ongoing supplementation.    It was confirmed with patient's daughter, Bonny, that patient's CODE STATUS is actually a DNR CCA.  Paper form signed and placed in chart.     Prior to admission, patient's family had been working towards moving her into a memory care unit in [place], [place]. Case

## 2024-08-16 NOTE — PLAN OF CARE
Problem: Discharge Planning  Goal: Discharge to home or other facility with appropriate resources  8/16/2024 0749 by Che Mcmahon, RN  Outcome: Progressing   Assessed patients knowledge of discharge.  Will continue to work with patient on discharge planning and answer patient questions. Will consult case management and  as necessary.   Problem: Safety - Adult  Goal: Free from fall injury  8/16/2024 0749 by Che Mcmahon, RN  Outcome: Progressing   Will remain free from falls. Fall precautions are in place. Call light, telephone and bedside table are within reach.   Problem: ABCDS Injury Assessment  Goal: Absence of physical injury  8/16/2024 0749 by Che Mcmahon, RN  Outcome: Progressing

## 2024-08-17 PROCEDURE — 6370000000 HC RX 637 (ALT 250 FOR IP)

## 2024-08-17 PROCEDURE — 99223 1ST HOSP IP/OBS HIGH 75: CPT | Performed by: REGISTERED NURSE

## 2024-08-17 PROCEDURE — 6370000000 HC RX 637 (ALT 250 FOR IP): Performed by: REGISTERED NURSE

## 2024-08-17 PROCEDURE — 1200000000 HC SEMI PRIVATE

## 2024-08-17 PROCEDURE — 6370000000 HC RX 637 (ALT 250 FOR IP): Performed by: HOSPITALIST

## 2024-08-17 PROCEDURE — 6370000000 HC RX 637 (ALT 250 FOR IP): Performed by: NURSE PRACTITIONER

## 2024-08-17 PROCEDURE — 94760 N-INVAS EAR/PLS OXIMETRY 1: CPT

## 2024-08-17 PROCEDURE — 6360000002 HC RX W HCPCS: Performed by: HOSPITALIST

## 2024-08-17 PROCEDURE — 6370000000 HC RX 637 (ALT 250 FOR IP): Performed by: INTERNAL MEDICINE

## 2024-08-17 RX ORDER — ESCITALOPRAM OXALATE 10 MG/1
5 TABLET ORAL DAILY
Status: DISCONTINUED | OUTPATIENT
Start: 2024-08-17 | End: 2024-08-19 | Stop reason: HOSPADM

## 2024-08-17 RX ORDER — ARIPIPRAZOLE 10 MG/1
5 TABLET ORAL NIGHTLY
Status: DISCONTINUED | OUTPATIENT
Start: 2024-08-17 | End: 2024-08-19 | Stop reason: HOSPADM

## 2024-08-17 RX ORDER — ESCITALOPRAM OXALATE 5 MG/1
5 TABLET ORAL DAILY
Qty: 30 TABLET | Refills: 0 | Status: SHIPPED | OUTPATIENT
Start: 2024-08-17

## 2024-08-17 RX ORDER — ARIPIPRAZOLE 5 MG/1
5 TABLET ORAL NIGHTLY
Qty: 30 TABLET | Refills: 0 | Status: SHIPPED | OUTPATIENT
Start: 2024-08-17

## 2024-08-17 RX ORDER — LANOLIN ALCOHOL/MO/W.PET/CERES
9 CREAM (GRAM) TOPICAL NIGHTLY PRN
Status: DISCONTINUED | OUTPATIENT
Start: 2024-08-17 | End: 2024-08-19 | Stop reason: HOSPADM

## 2024-08-17 RX ADMIN — CYANOCOBALAMIN TAB 1000 MCG 1000 MCG: 1000 TAB at 09:03

## 2024-08-17 RX ADMIN — ENOXAPARIN SODIUM 40 MG: 100 INJECTION SUBCUTANEOUS at 09:03

## 2024-08-17 RX ADMIN — ARIPIPRAZOLE 5 MG: 10 TABLET ORAL at 20:22

## 2024-08-17 RX ADMIN — Medication 9 MG: at 22:46

## 2024-08-17 RX ADMIN — ESCITALOPRAM OXALATE 5 MG: 10 TABLET ORAL at 17:33

## 2024-08-17 RX ADMIN — LISINOPRIL 5 MG: 5 TABLET ORAL at 09:03

## 2024-08-17 RX ADMIN — Medication 9 MG: at 02:14

## 2024-08-17 ASSESSMENT — PAIN SCALES - GENERAL
PAINLEVEL_OUTOF10: 0
PAINLEVEL_OUTOF10: 0

## 2024-08-17 NOTE — CONSULTS
Neurology Consult Note  Reason for Consult: syncope episode    Chief complaint: I feel great    Remigio Zacarias MD asked me to see Carleen Colvin in consultation for evaluation of syncope episode    History of Present Illness:  Carleen Colvin is a 68 y.o. female who presents with syncope.     I obtained my information via discussion w/ the patient's son Fredi on the phone, supplemented by chart review.     The patient's son has been taking care of her for the past year or so.  He says he was getting ready for work around 1645 on Tuesday.  His mother was sitting on a stool in the kitchen and said that she was feeling a bit dizzy and had to lay her head down on the kitchen table.  He says she mentioned something about her heart though can't recall anything specific.      They thought it would help to get her somewhere where she could lay down.  He was helping her to bed when she again said she just needed to lay her head down.  At that point she looked like a deer in headlights.  She went limp.  He was able to catch her and she felt like dead weight.  He was able to drag her to the couch.  He said for the first 20 seconds she looked lifeless.  He actually thought she may have had a heart attack and passed.  After about a minute or so she was able to come to and was asking what happened.  She felt nauseated like she was going to vomit.  There was no convulsions.  No tongue biting or incontinence.  She was subsequently transported to the ED in order to be evaluated.      Currently she feels great.  She has no specific neurologic complaints.    It appears she may have had a syncopal episode back in 2021.      He did also say that while she has no been formally diagnosed she has known cognitive impairment and short term memory loss.    Medical History:  Past Medical History:   Diagnosis Date    Chronic kidney disease     COPD (chronic obstructive pulmonary disease) ([facility])      History reviewed. No pertinent 
parenchymal abnormality.    EKG:     Vitals:   PVC 0  8/17/2024 12:06:09 Saved strip re?labeled to Sinus Tachy ?BR? IN 0.15 QRS 0.10 QT 0.35      Assessment   A 68 y.o female with hx bipolar I disorder, memory loss, hx IP psych admissions,  and suicide attempts presented to ED with syncope episode. Patient was about to get discharged when she made suicidal statement- desire to harm her self and end her life with OD. She states she has been feeling depressed and feels guilt to be alive. Health decline and off psychotropic medications over a year may have contributed the current depressive symptoms.       psychiatric impression/ Dx     Bipolar I disorder, depressed mood    2.  Anxiety     3. Suicidal ideation     RECOMMENDATIONS:   Psychiatric:     # Recommend inpatient mental health admission, medication initiation and titration, safe and therapeutic environment.    #  Continue 1:1 bed side sitter. Hold- Involuntarily. She can not leave AMA.      # Start Lexapro 5 mg daily and Abilify 5 mg nightly. ( Defer restarting Cymbalta as B/P slightly elevated ). Continue PRN melatonin for sleep. Patient will benefit from MH services in a community after IP psych admission.     # Medical records, labs, diagnostic tests reviewed. Noted B12, Folate and TSH results. Will check Vitamin D level.     # will follow along.     Dispo: IP geriatric psychiatric admission when medically stable. She has expressed a clear plan on how to end her life,.   Safety:RF:  hx multiple IP  psych admissions, hx Suicide attempts, memory loss, anxiety, off psychotropic medications, no MH provider in a community. She is HIGH risk for self harm at this time.     Pink slip: signed 8/17/24     Thank you for this consult, please call the psychiatry consult line for further questions.    Eric Marsh, MIGNON, PMHNP-BC, CNP  [facility]/ psychiatry

## 2024-08-17 NOTE — CARE COORDINATION
CASE MANAGEMENT DISCHARGE SUMMARY:    DISCHARGE DATE: 8/17/24    DISCHARGED TO:      Nursing Facility:   Name: [facility]  Address: [address] [place] [place] [address]   Nursing Report Phone: [phone]  Fax: [phone]      TRANSPORTATION: [facility]              TIME: 9:45     INSURANCE PRECERT OBTAINED: n/a    CLIFF/P[place]AND COMPLETED: completed by facility    Will need to fax AVS to facility when completed.     Electronically signed by MORGAN Sky on 8/17/2024 at 9:34 AM

## 2024-08-17 NOTE — CARE COORDINATION
Informed by bedside RN that patient has expressed suicidal ideations and discharge will be cancelled. Transportation canceled in [facility] called to cancel trip. Jewel with [facility] call and informed of cancelled discharge. Bedside RN will call patient's son to inform.  Electronically signed by MORGAN Sky on 8/17/2024 at 10:00 AM  [phone]

## 2024-08-17 NOTE — PLAN OF CARE
Problem: Discharge Planning  Goal: Discharge to home or other facility with appropriate resources  8/17/2024 1204 by Romina River RN  Flowsheets (Taken 8/14/2024 1133 by June Kwok RN)  Discharge to home or other facility with appropriate resources: Identify barriers to discharge with patient and caregiver  8/17/2024 1201 by Romina River RN  Outcome: Progressing  Flowsheets (Taken 8/14/2024 1133 by June Kwok RN)  Discharge to home or other facility with appropriate resources: Identify barriers to discharge with patient and caregiver  8/17/2024 0034 by Felicita Jesus RN  Outcome: Progressing     Problem: Safety - Adult  Goal: Free from fall injury  8/17/2024 1204 by Romina River RN  Outcome: Progressing  Flowsheets (Taken 8/16/2024 2200 by Felicita Jesus RN)  Free From Fall Injury: Instruct family/caregiver on patient safety  8/17/2024 1201 by Romina River RN  Outcome: Progressing  Flowsheets (Taken 8/16/2024 2200 by Felicita Jesus RN)  Free From Fall Injury: Instruct family/caregiver on patient safety  8/17/2024 0034 by Felicita Jesus RN  Outcome: Progressing  Flowsheets (Taken 8/16/2024 2200)  Free From Fall Injury: Instruct family/caregiver on patient safety     Problem: ABCDS Injury Assessment  Goal: Absence of physical injury  8/17/2024 1204 by Romina River RN  Outcome: Progressing  Flowsheets (Taken 8/16/2024 2200 by Felicita Jesus RN)  Absence of Physical Injury: Implement safety measures based on patient assessment  8/17/2024 1201 by Romina River RN  Outcome: Progressing  Flowsheets (Taken 8/16/2024 2200 by Felicita Jesus RN)  Absence of Physical Injury: Implement safety measures based on patient assessment  8/17/2024 0034 by Felicita Jesus RN  Outcome: Progressing  Flowsheets (Taken 8/16/2024 2200)  Absence of Physical Injury: Implement safety measures based on patient assessment

## 2024-08-17 NOTE — PLAN OF CARE
Problem: Discharge Planning  Goal: Discharge to home or other facility with appropriate resources  Outcome: Progressing     Problem: Safety - Adult  Goal: Free from fall injury  Outcome: Progressing  Flowsheets (Taken 8/16/2024 2200)  Free From Fall Injury: Instruct family/caregiver on patient safety     Problem: ABCDS Injury Assessment  Goal: Absence of physical injury  Outcome: Progressing  Flowsheets (Taken 8/16/2024 2200)  Absence of Physical Injury: Implement safety measures based on patient assessment     Problem: Discharge Planning  Goal: Discharge to home or other facility with appropriate resources  Outcome: Progressing     Problem: Safety - Adult  Goal: Free from fall injury  Outcome: Progressing  Flowsheets (Taken 8/16/2024 2200)  Free From Fall Injury: Instruct family/caregiver on patient safety     Problem: ABCDS Injury Assessment  Goal: Absence of physical injury  Outcome: Progressing  Flowsheets (Taken 8/16/2024 2200)  Absence of Physical Injury: Implement safety measures based on patient assessment

## 2024-08-17 NOTE — PLAN OF CARE
Problem: Discharge Planning  Goal: Discharge to home or other facility with appropriate resources  8/17/2024 1201 by Romina River RN  Outcome: Progressing  Flowsheets (Taken 8/14/2024 1133 by June Kwok, RN)  Discharge to home or other facility with appropriate resources: Identify barriers to discharge with patient and caregiver  8/17/2024 0034 by Felicita Jesus RN  Outcome: Progressing     Problem: Safety - Adult  Goal: Free from fall injury  8/17/2024 1201 by Romina River RN  Outcome: Progressing  Flowsheets (Taken 8/16/2024 2200 by Felicita Jesus RN)  Free From Fall Injury: Instruct family/caregiver on patient safety  8/17/2024 0034 by Felicita Jesus RN  Outcome: Progressing  Flowsheets (Taken 8/16/2024 2200)  Free From Fall Injury: Instruct family/caregiver on patient safety     Problem: ABCDS Injury Assessment  Goal: Absence of physical injury  8/17/2024 1201 by Romina River RN  Outcome: Progressing  Flowsheets (Taken 8/16/2024 2200 by Felicita Jesus RN)  Absence of Physical Injury: Implement safety measures based on patient assessment  8/17/2024 0034 by Felicita Jesus RN  Outcome: Progressing  Flowsheets (Taken 8/16/2024 2200)  Absence of Physical Injury: Implement safety measures based on patient assessment

## 2024-08-18 PROCEDURE — 1200000000 HC SEMI PRIVATE

## 2024-08-18 PROCEDURE — 6370000000 HC RX 637 (ALT 250 FOR IP): Performed by: HOSPITALIST

## 2024-08-18 PROCEDURE — 6360000002 HC RX W HCPCS: Performed by: HOSPITALIST

## 2024-08-18 PROCEDURE — 99232 SBSQ HOSP IP/OBS MODERATE 35: CPT | Performed by: REGISTERED NURSE

## 2024-08-18 PROCEDURE — 6370000000 HC RX 637 (ALT 250 FOR IP)

## 2024-08-18 PROCEDURE — 94760 N-INVAS EAR/PLS OXIMETRY 1: CPT

## 2024-08-18 PROCEDURE — 6370000000 HC RX 637 (ALT 250 FOR IP): Performed by: INTERNAL MEDICINE

## 2024-08-18 PROCEDURE — 6370000000 HC RX 637 (ALT 250 FOR IP): Performed by: REGISTERED NURSE

## 2024-08-18 RX ADMIN — LISINOPRIL 5 MG: 5 TABLET ORAL at 11:22

## 2024-08-18 RX ADMIN — CYANOCOBALAMIN TAB 1000 MCG 1000 MCG: 1000 TAB at 11:22

## 2024-08-18 RX ADMIN — ENOXAPARIN SODIUM 40 MG: 100 INJECTION SUBCUTANEOUS at 11:21

## 2024-08-18 RX ADMIN — ARIPIPRAZOLE 5 MG: 10 TABLET ORAL at 19:58

## 2024-08-18 RX ADMIN — ESCITALOPRAM OXALATE 5 MG: 10 TABLET ORAL at 11:22

## 2024-08-18 NOTE — DISCHARGE SUMMARY
Hospital Medicine Discharge Summary    Patient ID: Carleen Colvin      Patient's PCP: Dwight Guzman MD    Admit Date: 8/13/2024     Discharge Date:   08/18/24    Admitting Physician: Remigio Newsome MD     Discharging Provider: Annie Hernandez PA-C       Hospital Course:   68-year-old female with past medical history of cognitive decline, essential hypertension, COPD, CKD, bipolar 1 who presented to the ED after witnessed syncope episode lasting a few seconds at her home.  Patient is a poor historian, only alert to self.  Per epic chart review, son was at home and caught her when she started to pass out.  No prodromal symptoms were reported.  While in ED, CBC was unremarkable, BMP only noted for elevated BUN and anion gap.  Troponin was negative x 2.  CXR showed no acute cardiopulmonary process.  Head CT was negative.  She was admitted for further workup/treatment. My colleague spoke to patient's son, Fredi, who was with her during event. He reported she does have dizzy spells from time to time and did report dizziness prior to syncope, which lasted 30-40 seconds. Patient's son recently moved back to [place] 1 year ago, and reports she has not been taking any of her medications or seen a doctor in a few years. Workup unremarkable to explain syncopal episode.  Neurology consulted for recommendations on 8/15. Patient had brain MRI which was non-acute. EEG was recommended and will be completed as outpatient. Neurology to order EEG and follow up outpatient regarding results. She was also started on B12 supplementation given cognitive decline. She will need repeat B12 level checked in 1 month to determine need for ongoing supplementation.     It was confirmed with patient's daughter, Bonny, that patient's CODE STATUS is actually a DNR CCA.  Paper form signed and placed in chart.     Prior to admission, patient's family had been working towards moving her into a memory care unit in [place], [place]. Case

## 2024-08-18 NOTE — CARE COORDINATION
Patient needs inpatient [facility]. Transfer process initiated through [facility].   Electronically signed by MORGAN Sky on 8/18/2024 at 3:28 PM

## 2024-08-18 NOTE — PRE-PROCEDURE INSTRUCTIONS
Patient resting in bed. A&OX2, VSS. Pt denies pain at this time. AM medications administered as ordered (see eMAR). HTT Assessment complete. Pt denies any further needs at this time. Fall precautions in place. Sitter remains bedside for patients safety.

## 2024-08-19 VITALS
TEMPERATURE: 98.2 F | HEIGHT: 72 IN | OXYGEN SATURATION: 92 % | DIASTOLIC BLOOD PRESSURE: 79 MMHG | SYSTOLIC BLOOD PRESSURE: 138 MMHG | WEIGHT: 121.91 LBS | RESPIRATION RATE: 17 BRPM | BODY MASS INDEX: 16.51 KG/M2 | HEART RATE: 82 BPM

## 2024-08-19 LAB — 25(OH)D3 SERPL-MCNC: 9.8 NG/ML

## 2024-08-19 PROCEDURE — 36415 COLL VENOUS BLD VENIPUNCTURE: CPT

## 2024-08-19 PROCEDURE — 94760 N-INVAS EAR/PLS OXIMETRY 1: CPT

## 2024-08-19 PROCEDURE — 6370000000 HC RX 637 (ALT 250 FOR IP): Performed by: REGISTERED NURSE

## 2024-08-19 PROCEDURE — 82306 VITAMIN D 25 HYDROXY: CPT

## 2024-08-19 PROCEDURE — 6370000000 HC RX 637 (ALT 250 FOR IP): Performed by: INTERNAL MEDICINE

## 2024-08-19 PROCEDURE — 6370000000 HC RX 637 (ALT 250 FOR IP): Performed by: HOSPITALIST

## 2024-08-19 PROCEDURE — 6370000000 HC RX 637 (ALT 250 FOR IP)

## 2024-08-19 RX ORDER — VITAMIN B COMPLEX
2000 TABLET ORAL DAILY
Status: DISCONTINUED | OUTPATIENT
Start: 2024-08-19 | End: 2024-08-19 | Stop reason: HOSPADM

## 2024-08-19 RX ORDER — CHOLECALCIFEROL (VITAMIN D3) 50 MCG
2000 TABLET ORAL DAILY
Qty: 60 TABLET | Refills: 0
Start: 2024-08-19

## 2024-08-19 RX ADMIN — LISINOPRIL 5 MG: 5 TABLET ORAL at 08:54

## 2024-08-19 RX ADMIN — Medication 2000 UNITS: at 09:04

## 2024-08-19 RX ADMIN — CYANOCOBALAMIN TAB 1000 MCG 1000 MCG: 1000 TAB at 08:55

## 2024-08-19 RX ADMIN — ESCITALOPRAM OXALATE 5 MG: 10 TABLET ORAL at 08:55

## 2024-08-19 NOTE — PLAN OF CARE
Problem: Discharge Planning  Goal: Discharge to home or other facility with appropriate resources  8/18/2024 2054 by Marla Ravi RN  Outcome: Progressing  Flowsheets (Taken 8/18/2024 1138 by Evelina Larkin, RN)  Discharge to home or other facility with appropriate resources: Refer to discharge planning if patient needs post-hospital services based on physician order or complex needs related to functional status, cognitive ability or social support system  8/18/2024 1137 by Evelina Larkin, RN  Outcome: Progressing     Problem: Safety - Adult  Goal: Free from fall injury  8/18/2024 2054 by Marla Ravi RN  Outcome: Progressing  8/18/2024 1137 by Evelina Larkin, RN  Outcome: Progressing     Problem: ABCDS Injury Assessment  Goal: Absence of physical injury  8/18/2024 2054 by Marla Ravi RN  Outcome: Progressing  8/18/2024 1137 by Evelina Larkin RN  Outcome: Progressing

## 2024-08-19 NOTE — CARE COORDINATION
CASE MANAGEMENT DISCHARGE SUMMARY:    DISCHARGE DATE: 8/19/2024    DISCHARGED TO [facility]     TRANSPORTATION: [facility]             TIME: 1pm     COMMENTS: Spoke with Solange at the [facility]: Patient will transfer to [facility] inpatient psych at 1pm. Rn aware.     Electronically signed by LAXMI Salinas, LISW, Case Management on 8/19/2024 at 3:26 PM  [place] [phone]

## 2024-08-19 NOTE — PLAN OF CARE
Problem: Discharge Planning  Goal: Discharge to home or other facility with appropriate resources  8/19/2024 0744 by Laurel Sheridan RN  Outcome: Progressing  Flowsheets (Taken 8/19/2024 0744)  Discharge to home or other facility with appropriate resources:   Identify barriers to discharge with patient and caregiver   Identify discharge learning needs (meds, wound care, etc)  8/18/2024 2054 by Marla Ravi, RN  Outcome: Progressing  Flowsheets (Taken 8/18/2024 1138 by Evelina Larkin, RN)  Discharge to home or other facility with appropriate resources: Refer to discharge planning if patient needs post-hospital services based on physician order or complex needs related to functional status, cognitive ability or social support system     Problem: Safety - Adult  Goal: Free from fall injury  8/19/2024 0744 by Laurel Sheridan RN  Flowsheets (Taken 8/19/2024 0744)  Free From Fall Injury:   Instruct family/caregiver on patient safety   Based on caregiver fall risk screen, instruct family/caregiver to ask for assistance with transferring infant if caregiver noted to have fall risk factors  8/18/2024 2054 by Marla Ravi, RN  Outcome: Progressing     Problem: ABCDS Injury Assessment  Goal: Absence of physical injury  8/19/2024 0744 by Laurel Sheridan RN  Flowsheets (Taken 8/19/2024 0744)  Absence of Physical Injury: Implement safety measures based on patient assessment  8/18/2024 2054 by Marla Ravi, RN  Outcome: Progressing

## 2024-08-19 NOTE — CARE COORDINATION
The transfer center requested DNR and psych hold paperwork be faxed. Spoke with Kim at the [facility] transfer center and confirmed fax numbers of [phone] and to [facility] [phone]. Information faxed to both numbers.    Electronically signed by LAXMI Salinas, BLANCHE, Case Management on 8/19/2024 at 11:33 AM  [facility] [phone]    11:41 AM  Received success notifications for both faxes sent (as above).    Electronically signed by LAXMI Salinas, BLANCHE, Case Management on 8/19/2024 at 11:41 AM  [facility] [phone]

## 2024-08-20 NOTE — PROGRESS NOTES
Hospitalist Progress Note      PCP: Dwight Guzman MD    Date of Admission: 8/13/2024    Chief Complaint: Syncope    Hospital Course: 68-year-old female with past medical history of cognitive decline, essential hypertension, COPD, CKD, bipolar 1 who presented to the ED after witnessed syncope episode lasting a few seconds at her home.  Patient is a poor historian, only alert to self.  Per epic chart review, son was at home and caught her when she started to pass out.  No prodromal symptoms were reported.  While in ED, CBC was unremarkable, BMP only noted for elevated BUN and anion gap.  Troponin was negative x 2.  CXR showed no acute cardiopulmonary process.  Head CT was negative.  She was admitted for further workup/treatment. My colleague spoke to patient's son, Fredi, who was with her during event. He reported she does have dizzy spells from time to time and did report dizziness prior to syncope, which lasted 30-40 seconds. Patient's son recently moved back to [place] 1 year ago, and reports she has not been taking any of her medications or seen a doctor in a few years. Workup unremarkable to explain syncopal episode.  Neurology consulted for recommendations on 8/15. Patient had brain MRI which was non-acute. EEG was recommended and will be completed as outpatient. Neurology to order EEG and follow up outpatient regarding results. She was also started on B12 supplementation given cognitive decline. She will need repeat B12 level checked in 1 month to determine need for ongoing supplementation.    It was confirmed with patient's daughter, Bonny, that patient's CODE STATUS is actually a DNR CCA.  Paper form signed and placed in chart.     Prior to admission, patient's family had been working towards moving her into a memory care unit in [place], [place]. Case management facilitated placement into a SNF until more permanent placement can be secured.      Discharge order placed 8/16. Patient unable to 
      Hospitalist Progress Note      PCP: Dwight Guzman MD    Date of Admission: 8/13/2024    Chief Complaint: Syncope    Hospital Course: 68-year-old female with past medical history of cognitive decline, essential hypertension, COPD, CKD, bipolar 1 who presented to the ED after witnessed syncope episode lasting a few seconds at her home.  Patient is a poor historian, only alert to self.  Per epic chart review, son was at home and caught her when she started to pass out.  No prodromal symptoms were reported.  While in ED, CBC was unremarkable, BMP only noted for elevated BUN and anion gap.  Troponin was negative x 2.  CXR showed no acute cardiopulmonary process.  Head CT was negative.  She was admitted for further workup/treatment. My colleague spoke to patient's son, Fredi, who was with her during event. He reported she does have dizzy spells from time to time and did report dizziness prior to syncope, which lasted 30-40 seconds. Patient's son recently moved back to [place] 1 year ago, and reports she has not been taking any of her medications or seen a doctor in a few years. Workup unremarkable to explain syncopal episode.  Neurology consulted for recommendations on 8/15. Patient had brain MRI which was non-acute. EEG was recommended and will be completed as outpatient. Neurology to order EEG and follow up outpatient regarding results. She was also started on B12 supplementation given cognitive decline. She will need repeat B12 level checked in 1 month to determine need for ongoing supplementation.    It was confirmed with patient's daughter, Bonny, that patient's CODE STATUS is actually a DNR CCA.  Paper form signed and placed in chart.     Prior to admission, patient's family had been working towards moving her into a memory care unit in [place], [place]. Case management facilitated placement into a SNF until more permanent placement can be secured.      Discharge order placed 8/16. Patient unable to 
      Hospitalist Progress Note      PCP: Dwight Guzman MD    Date of Admission: 8/13/2024    Chief Complaint: Syncope episode.    Hospital Course: 68-year-old female with past medical history of cognitive decline, essential hypertension, COPD, CKD, bipolar 1 who presented to the ED after witnessed syncope episode lasting a few seconds at her home.  Patient is a poor historian, only alert to self.  Per epic chart review, son was at home and caught her when she started to pass out.  No prodromal symptoms were reported.  While in ED, CBC was unremarkable, BMP only noted for elevated BUN and anion gap.  Troponin was negative x 2.  CXR showed no acute cardiopulmonary process.  Head CT was negative.  She was admitted for further workup/treatment.    Spoke to son Fredi who was with her during event.  States she does have dizzy spells from time to time and did report dizziness prior to syncope.  Was out for 30 to 40 seconds.  Elliott's son recently moved back to [place] 1 year ago, states she has not taken any of her medicine or seen a doctor in a few years.  They are currently working on placement for her at a facility in [facility] due to memory issues/cognitive decline.  Also spoke to patient's daughter Bonny, confirmed that her CODE STATUS is actually a DNR CCA.      Subjective: Patient seen lying in bed, is alert only to herself.  Does not know the month, year, where we are or why she came to the hospital.  She does not remember passing out.  Patient did state that her son Fredi does live with her.  She denies any chest pain, no visual changes, no headache, no shortness of breath.  No current dizziness or lightheadedness.  No heart palpitations, no nausea/vomiting.  No other infectious symptoms.    Assessment/Plan:    Witnessed Syncope   -CT head negative. CBC and BMP without significant abnormalities  -CXR negative. Will check UA  -Check TSH, Vit B12, Folate  -Orthos negative, will recheck this 
  Neurology Progress Note    Updates  No acute events overnight.   No specific neurologic complaints.     Medical History:  Past Medical History:   Diagnosis Date    Chronic kidney disease     COPD (chronic obstructive pulmonary disease) (HCC)      History reviewed. No pertinent surgical history.    Scheduled Meds:   vitamin B-12  50 mcg Oral Daily    nicotine  1 patch TransDERmal Daily    lisinopril  5 mg Oral Daily    sodium chloride flush  5-40 mL IntraVENous 2 times per day    enoxaparin  40 mg SubCUTAneous Daily     No medications prior to admission.    Allergies   Allergen Reactions    Cogentin [Benztropine]     Aspirin Nausea And Vomiting     History reviewed. No pertinent family history.    Social History     Tobacco Use   Smoking Status Former   Smokeless Tobacco Never     Social History     Substance and Sexual Activity   Drug Use Not on file     Social History     Substance and Sexual Activity   Alcohol Use None     ROS  Constitutional- No weight loss or fevers  Eyes- No diplopia. No photophobia.  Ears/nose/throat- No dysphagia. No Dysarthria  Cardiovascular- No palpitations. No chest pain  Respiratory- No dyspnea. No Cough  Gastrointestinal- No Abdominal pain. No Vomiting.  Genitourinary- No incontinence. No urinary retention  Musculoskeletal- No myalgia. No arthralgia  Skin- No rash. No easy bruising.  Psychiatric- No depression. No anxiety  Endocrine- No diabetes. No thyroid issues.  Hematologic- No bleeding difficulty. No fatigue  Neurologic- No weakness. No Headache.    Exam  Blood pressure (!) 165/93, pulse 75, temperature 98 °F (36.7 °C), temperature source Oral, resp. rate 18, height 1.829 m (6'), weight 53.8 kg (118 lb 9.7 oz), SpO2 93%.  Constitutional    Vital signs: BP, HR, and RR reviewed   General alert, no distress  Eyes: unable to visualize the fundi  Cardiovascular: no peripheral edema.  Psychiatric: cooperative with examination, no psychotic behavior noted.  Neurologic  Mental status: 
  PSYCHIATRY CONSULT PROGRESS NOTE    8/18/2024  Carleen Colvin  [id number]  Referring Provider:  Remigio Newsome MD    CC/Reason for Consult: Suicidal ideation      Assessment   A 68 y.o female with hx bipolar I disorder, memory loss, hx IP psych admissions,  and hx suicide attempts presented to ED with syncope episode. Patient was about to get discharged when she made suicidal statement- desire to harm her self and end her life with OD. She states she has been feeling depressed and feels guilt to be alive. Health decline and off psychotropic medications over a year may have contributed the current depressive symptoms.        psychiatric impression/ Dx      Bipolar I disorder, depressed mood     2.  Anxiety      3. Suicidal ideation with intent to harm self     RECOMMENDATIONS:   Psychiatric:      # Recommend inpatient mental health admission, medication initiation and titration, safe and therapeutic environment.     #  Continue 1:1 bed side sitter. Hold- Involuntarily. She can not leave AMA.       # continue  Lexapro 5 mg daily and Abilify 5 mg nightly. ( Defer restarting Cymbalta as B/P slightly elevated ). Continue PRN melatonin for sleep. Patient will benefit from MH services in a community after IP psych admission.      # Medical records, labs, diagnostic tests reviewed. Noted B12, Folate and TSH results. Will check Vitamin D level.      # discussed plan of care with Dr Newsome.      Dispo: IP geriatric psychiatric admission when medically stable. She has expressed a clear plan on how to end her life,.   Safety:RF:  hx multiple IP  psych admissions, hx Suicide attempts, memory loss, anxiety, off psychotropic medications, no MH provider in a community. She is HIGH risk for self harm at this time.      Pink slip: signed 8/17/24     S: Seen in a room; sitter at bed side. States, \" I feel numb.\" Inquiring where she will be going after discharge. Denies any SE to Lexapro or Abilify which started yesterday. 
 Patient in bed resting in bed at this time.Patient is  alert and oriented x 2. Able to make all needs known.  Assessment completed. VS WNL.. IV capped and flushed. Fall precautions in place. Call light within reach.    
4 Eyes Skin Assessment     NAME:  Carleen Colvin  YOB: 1955  MEDICAL RECORD NUMBER:  [id number]    The patient is being assessed for  Admission    I agree that at least one RN has performed a thorough Head to Toe Skin Assessment on the patient. ALL assessment sites listed below have been assessed.      Areas assessed by both nurses:    Head, Face, Ears, Shoulders, Back, Chest, Arms, Elbows, Hands, Sacrum. Buttock, Coccyx, Ischium, Legs. Feet and Heels, and Under Medical Devices         Does the Patient have a Wound? No noted wound(s)       Roni Prevention initiated by RN: No  Wound Care Orders initiated by RN: No    Pressure Injury (Stage 3,4, Unstageable, DTI, NWPT, and Complex wounds) if present, place Wound referral order by RN under : No    New Ostomies, if present place, Ostomy referral order under : No     Nurse 1 eSignature: Electronically signed by Annie Webster RN on 8/14/24 at 12:36 AM EDT    **SHARE this note so that the co-signing nurse can place an eSignature**    Nurse 2 eSignature: Electronically signed by Felicita Hess RN on 8/14/24 at 12:36 AM EDT   
Call received from [employer] transporter that is actively transporting patient to inpatient psych. Patient to be taken to [place]. Transporter was concerned that family had not been updated regarding patient disposition / location of psych facility. Phone numbers provided to transporter for Raphael Colvin ([phone]) and Odilia Levy ([phone]), patient's children. [employer] transporter to alert family and ensure they are aware of plan of care / patient location.   
Call received from patient's son Fredi seeking verification that patient arrived at the [facility] for psychiatry. This RN informed Fredi that patient was discharged yesterday at approximately 1300 w/ medical transport & was headed to the [facility] for psychiatry. This RN unfortunately could not provide further information as updates on the patient condition / whereabouts are not provided to the unit after the patient discharges. Fredi reports understanding & was appreciative. Denies further needs / questions.   
Medication Reconciliation     List of medications patient is currently taking is complete.     Source of information: 1. Conversation with patient at bedside                                      2. EPIC records      Allergies  Cogentin [benztropine] and Aspirin     Notes regarding home medications:  1. Patient states she is not taking any Rx/OTC/supplements at this time. Pt family member confirms that pt has not seen a doctor in > 2 years.    Lona Sanchez, Pharmacy Intern  8/13/2024 8:12 PM     
Occupational Therapy  Facility/Department: [address] ORTHOPEDICS  Occupational Therapy Initial Assessment    Name: Carleen Colvin  : 1955  MRN: [id number]  Date of Service: 2024    Discharge Recommendations:  24 hour supervision or assist        Carleen Colvin scored a 21/24 on the AM-PAC ADL Inpatient form.  At this time, no further OT is recommended upon discharge. Recommend patient returns to prior setting with prior services.      Patient Diagnosis(es): The primary encounter diagnosis was Syncope and collapse. A diagnosis of Syncope, unspecified syncope type was also pertinent to this visit.  Past Medical History:  has a past medical history of Chronic kidney disease and COPD (chronic obstructive pulmonary disease) (HCC).  Past Surgical History:  has no past surgical history on file.    Treatment Diagnosis: impaired ADL.fxl mobility    Assessment   Performance deficits / Impairments: Decreased safe awareness;Decreased cognition;Decreased high-level IADLs  Assessment: 67 yo female admitted after syncope and collapse, awaiting further work up. PMH: cognitive decline, essential hypertension, COPD, CKD, bipolar 1. PTA, pt reports she lives with son (who works during the day) and is IND with ADL, IADL, fxl mobility no AD and drives. Unsure full accuracy d/t cognition. Today, pt functioning just below baseline limited by decreased safety awareness and cognition. Pt disoriented to time and situation. Pt completes bed mobility SUP, and standing grooming, fxl tx and fxl mobility household distances with no AD with  SBA. Pt with no unsteadiness or LOB. Pt declines further ADL, anticipate up to CGA LB ADL and SUP seated UB ADL based on balance, endurance, cognition and PLOF. BUE WFL for self care. Cont acute OT to address above deficits. Anticipate pt very near baseline and bale to return home with son with SUP  Treatment Diagnosis: impaired ADL.fxl mobility  Prognosis: Fair  Decision Making: Low 
Occupational Therapy  Facility/Department: [address] ORTHOPEDICS  Occupational Daily Treatment Note      Name: Carleen Colvin  : 1955  MRN: [id number]  Date of Service: 2024    Discharge Recommendations:  Patient would benefit from continued therapy after discharge, 3-5 sessions per week          Patient Diagnosis(es): The primary encounter diagnosis was Syncope and collapse. A diagnosis of Syncope, unspecified syncope type was also pertinent to this visit.  Past Medical History:  has a past medical history of Chronic kidney disease and COPD (chronic obstructive pulmonary disease) (HCC).  Past Surgical History:  has no past surgical history on file.    Treatment Diagnosis: impaired ADL.fxl mobility    Carleen Colvin scored a 21/24 on the AM-PAC ADL Inpatient form. Current research shows that an AM-PAC score of 17 or less is typically not associated with a discharge to the patient's home setting. Based on the patient's AM-PAC score and their current ADL deficits, it is recommended that the patient have 3-5 sessions per week of Occupational Therapy at d/c to increase the patient's independence.  Please see assessment section for further patient specific details.    If patient discharges prior to next session this note will serve as a discharge summary.  Please see below for the latest assessment towards goals.     Assessment   Performance deficits / Impairments: Decreased safe awareness;Decreased cognition;Decreased high-level IADLs  Assessment: Discussed with OTR am pac score is 21. Patient is close to baseline at this time. Plan at this time is for discharge to [facility]. Patient completed bed mobility with SBA.  SBA for sit<>stand with no AD. Functional mobility without device with SBA, slow steady gait with no overt LOB noted. Transfer to couch, recliner chair with bed with SBA. AROM of B UE's seated on edge of bed 10-15 reps to increase endurance. Patient would benefit from 
Patient admitted to unit. VSS. Denies pain. Alert to self and place but forgetful. Denies dizziness. Fluids running. NSR on tele. No skin issues. Fall precautions in place.   
Patient awake and resting in bed. Assessment complete. Alert and oriented to person and place. Denies pain. Denies needs or concerns. Call light within reach. Able to make needs known. Fall precautions in place.   
Patient awake and resting in bed. Assessment complete. Alert and oriented to self and place. Orthostatics complete. IV clean dry and intact. Pt denies pain. Call light within reach. Able to make needs known. Fall precautions in place.   
Patient is A&O to self and place; disoriented to time and situation. Patient is resting in bed, awake and quiet. Room air. Side rails are up x2. Fall precautions are in place. Bed alarm on. Bed is in lowest position. Call light, telephone and bedside table are within reach. Electronically signed by Che Mcmahon RN on 8/16/2024 at 5:50 PM    
Patient is A&O to self and place; disoriented to time and situation. Patient is resting in bed, awake and quiet. Room air. Side rails are up x2. Fall precautions are in place. Bed alarm on. Bed is in lowest position. Call light, telephone and bedside table are within reach. VSS taken. AM meds given. Shift assessment completed. Will continue to monitor patient per unit protocols. Electronically signed by Che Mcmahon RN on 8/16/2024 at 10:55 AM    
Patient leaving floor to echo.   
Patient removed IV. Expected to dc in am per notes as long as neurology gives OK. Perfect serve sent to Janell Gaffney NP. NP ok with IV being left out.   
Patient resting in bed at this time, orthostatics complete and urine sample sent to lab. Patient has no acute complaints, IVF infusing. This RN spoke with son Fredi and daughter Bonny via telephone and update on care was given.   
Patient resting in bed this evening, IVF continue to infuse. Patient has no acute complaints, bed alarm on.   
Physical Therapy  Facility/Department: [address] ORTHOPEDICS  Physical Therapy Initial Assessment  This note serves as D/C summary if patient is discharged prior to next treatment session.       Name: Carleen Colvin  : 1955  MRN: [id number]  Date of Service: 8/15/2024    Discharge Recommendations:  24 hour supervision or assist   PT Equipment Recommendations  Equipment Needed: No    Carleen Colvin scored a 20/24 on the AM-PAC short mobility form.  At this time, no further PT is recommended upon discharge due to being close to functional baseline.  Recommend patient returns to prior setting with prior services.          Patient Diagnosis(es): The primary encounter diagnosis was Syncope and collapse. A diagnosis of Syncope, unspecified syncope type was also pertinent to this visit.  Past Medical History:  has a past medical history of Chronic kidney disease and COPD (chronic obstructive pulmonary disease) (HCC).  Past Surgical History:  has no past surgical history on file.    Assessment   Body Structures, Functions, Activity Limitations Requiring Skilled Therapeutic Intervention: Decreased functional mobility   Assessment: Pt is a 69 y/o female who presented with syncope and collapse.  Pt lives with her son who works during the day and indep ambulating without an AD PTA.  Today, pt presents slightly below baseline requiring SBA to ambulate 200' in hallway and reported mild lightheadedness.  Anticipate need for 24 hr supervision upon d/c due to memory impairments but do not anticipate need for continued PT upon d/c.  Will continue to follow while hospitalized.  Treatment Diagnosis: functional mobility slightly below baseline  Therapy Prognosis: Good  Decision Making: Low Complexity  History: Varun Salas MD \"68f with history of dementia presents from home where she had an episode of LOC witnessed by her son. Patient is currently seen on room air, in no respiratory distress, awake, alert, coherently responsive 
Pt in bed tearful, no distress noted. Updated pt about transfer for 945am today. Pt has no questions or concerns at this time. Was able to console pt  no further crying noted. Pt call light in place.   
Pt is in bed, A&Ox4, pt is UPALIB. Pt has a sitter in room for suicide precaution. [facility] Electronically signed by Laurel Sheridan RN on 8/19/2024 at 12:30 PM   
Pt resting quietly in chair , resp easy and unlabored. IVF infusing without difficulty per orders. No s/s of acute distress noted at this time. Appears comfortable. Call light is within reach. Plan of care cont, no questions or concerns at this time     
Pt. Assessment complete. Pt. Lying in bed without complaints.  Sitter at bedside. All needs met at this time.   
This writer notified of pt expression of suicidal ideations per PA. Sitter placed at bedside. Pt assessed, noted to cont to cry. Discharge order canceled per PA.   
Transport is here to get pt. Pt has belongings, she is going to [place] for an inpatient psych facility. Electronically signed by Laurel Sheridan RN on 8/19/2024 at 12:52 PM   
WNL  -Orthos initially negative, recheck borderline positive but standing is still 139/97  -Troponin negative x 2.  Echo with normal EF, normal wall motion.  Indeterminate diastolic function.  No significant valve abnormalities.  -Continue to monitor on telemetry, no arrhythmias seen thus far  -Neurology consulted for recommendations 8/15    Essential hypertension  -BP controlled but not at goal.  Per son, patient has not been on BP meds in a few years.  Per chart review, used to be on lisinopril 5mg. Started again while inpatient. Will titrate up to goal.     Hypothyroidism  -Has not been taking Synthroid.  Updated TSH wnl.      Cognitive decline  -Patient has not been diagnosed formally.  Family currently attempting placement in [facility].  Currently only alert to himself, son states this is baseline.    Bipolar 1 disorder  -Per chart review has not been on Abilify, duloxetine, haloperidol since 2022.  Will need follow-up with PCP outpatient for resuming antipsychotic medications.    CKD  -Was following with nephrology until 2022.  No recent baseline in chart.  Creatinine currently WNL.  Continue to monitor    CODE STATUS  -Discussed with patient's daughter Bonny.  Patient is a DNR CCA  Active Hospital Problems    Diagnosis     Syncope and collapse [R55]        Medications:  Reviewed    Infusion Medications    sodium chloride      sodium chloride 75 mL/hr at 08/14/24 1507     Scheduled Medications    nicotine  1 patch TransDERmal Daily    lisinopril  5 mg Oral Daily    sodium chloride flush  5-40 mL IntraVENous 2 times per day    enoxaparin  40 mg SubCUTAneous Daily     PRN Meds: sodium chloride flush, sodium chloride, potassium chloride **OR** potassium alternative oral replacement **OR** potassium chloride, magnesium sulfate, ondansetron **OR** ondansetron, polyethylene glycol, acetaminophen **OR** acetaminophen, perflutren lipid microspheres      Intake/Output Summary (Last 24 hours) at 8/15/2024 
Daily    lisinopril  5 mg Oral Daily    sodium chloride flush  5-40 mL IntraVENous 2 times per day    enoxaparin  40 mg SubCUTAneous Daily     PRN Meds: melatonin, sodium chloride flush, sodium chloride, potassium chloride **OR** potassium alternative oral replacement **OR** potassium chloride, magnesium sulfate, ondansetron **OR** ondansetron, polyethylene glycol, acetaminophen **OR** acetaminophen, perflutren lipid microspheres      Intake/Output Summary (Last 24 hours) at 8/19/2024 0804  Last data filed at 8/19/2024 0745  Gross per 24 hour   Intake 1260 ml   Output --   Net 1260 ml       Physical Exam Performed:    BP (!) 152/82   Pulse 71   Temp 97.6 °F (36.4 °C) (Oral)   Resp 18   Ht 1.829 m (6') Comment: Stated  Wt 55.3 kg (121 lb 14.6 oz)   SpO2 96%   BMI 16.53 kg/m²     General appearance: No apparent distress, appears stated age and cooperative.  HEENT: Conjunctivae/corneas clear.  Neck: Supple, with full range of motion.   Respiratory:  Normal respiratory effort. Clear to auscultation, bilaterally without Rales/Wheezes/Rhonchi.  Cardiovascular: Regular rate and rhythm with normal S1/S2 without murmurs, rubs or gallops.  Abdomen: Soft, non-tender, non-distended with normal bowel sounds.  Musculoskeletal: No BLE edema  Neurologic:  Face symmetrical, speech clear, moves all 4 extremities, sensations are intact bilaterally.  No focal deficits noted  Psychiatric: Alert and oriented to self.  Not to situation, time or place.  Pleasantly confused. +SI         Urinalysis:      Lab Results   Component Value Date/Time    NITRU Negative 08/14/2024 11:15 AM    BLOODU Negative 08/14/2024 11:15 AM    GLUCOSEU Negative 08/14/2024 11:15 AM       Radiology:  MRI BRAIN WO CONTRAST   Preliminary Result   Cerebral atrophy.  Moderate to severe chronic small vessel ischemic changes.   No acute brain parenchymal abnormality.         CT HEAD WO CONTRAST   Final Result   No acute intracranial abnormality.         XR CHEST